# Patient Record
(demographics unavailable — no encounter records)

---

## 2025-03-27 NOTE — PHYSICAL EXAM
[TextEntry] : PHYSICAL EXAM  General: The patient was alert and oriented and in no distress. Voice was normal.    Face: The patient had no facial asymmetry or mass. The skin was unremarkable.  Ears: External ears were normal without deformity. Ear canals were normal. Tympanic membranes were intact and normal. No perforation or effusion I removed impacted cerumen from both ears as well hairs impinging on TM AU. I irrigated and then used #3 suction. Nose:  The external nose had no significant deformity.  There was no facial tenderness.  On anterior rhinoscopy, the nasal mucosa was normal.  The anterior septum was normal. There were no visualized polyps purulence  or masses.  Oral cavity: The oral mucosa was normal. The oral and base of tongue were clear and without mass. The gingival and buccal mucosa were moist and without lesions. The palate moved well. There was no cleft palate. There appeared to be good salivary flow.   There was no pus, erythema or mass in the oral cavity/oropharynx.  Neck:  The neck was symmetrical. The parotid and submandibular glands were normal without masses. The trachea was midline and there was no unusual crepitus. Thyroid was smooth and nontender and no masses were palpated. Cervical adenopathy- none.

## 2025-03-27 NOTE — ASSESSMENT
[FreeTextEntry1] : DAVID ATKINSON felt better after removing cerumen and hairs impinging on TM.RTC prn.

## 2025-03-27 NOTE — HISTORY OF PRESENT ILLNESS
[de-identified] : DAVID ATKINSON  is a 76 year man with a history of crackling sound/sensation AD.